# Patient Record
Sex: FEMALE | NOT HISPANIC OR LATINO | URBAN - METROPOLITAN AREA
[De-identification: names, ages, dates, MRNs, and addresses within clinical notes are randomized per-mention and may not be internally consistent; named-entity substitution may affect disease eponyms.]

---

## 2018-12-10 ENCOUNTER — OFFICE VISIT (OUTPATIENT)
Dept: URBAN - METROPOLITAN AREA CLINIC 76 | Facility: CLINIC | Age: 53
End: 2018-12-10
Payer: COMMERCIAL

## 2018-12-10 DIAGNOSIS — E11.3293 TYPE 2 DIAB W MILD NONPRLF DIABETIC RTNOP W/O MACULAR EDEMA, BILATERAL: ICD-10-CM

## 2018-12-10 DIAGNOSIS — H52.4 PRESBYOPIA: Primary | ICD-10-CM

## 2018-12-10 DIAGNOSIS — H04.123 DRY EYE SYNDROME OF BILATERAL LACRIMAL GLANDS: ICD-10-CM

## 2018-12-10 PROCEDURE — 92015 DETERMINE REFRACTIVE STATE: CPT | Performed by: OPTOMETRIST

## 2018-12-10 PROCEDURE — 92004 COMPRE OPH EXAM NEW PT 1/>: CPT | Performed by: OPTOMETRIST

## 2018-12-10 ASSESSMENT — INTRAOCULAR PRESSURE
OD: 15
OS: 15

## 2018-12-10 ASSESSMENT — VISUAL ACUITY
OD: 20/25
OS: 20/25

## 2018-12-10 NOTE — IMPRESSION/PLAN
Impression: Age-related nuclear cataract, bilateral: H25.13. OU. Plan: Cataracts account for the patient's complaints. Patient understands changing glasses will not improve vision. Recommend PRE OP w/ Dr. Dioni Murguia for cataract surgery.

## 2018-12-10 NOTE — IMPRESSION/PLAN
Impression: Dry eye syndrome of bilateral lacrimal glands: H04.123. Plan: Discussed diagnosis in detail with patient. Patient instructed to use emulsive base lubricant 3-4 x a day. Warm compresses with lid massage from top / down, bottom / up, and sweep from inside / out x 2. Increase omega foods.

## 2018-12-10 NOTE — IMPRESSION/PLAN
Impression: Type 2 diab w mild nonprlf diabetic rtnop w/o macular edema, bilateral: A15.9470. OU. Plan: Discussed diagnosis in detail with patient. No treatment is required at this time. Emphasized blood sugar control. Call if [place] worsens. Will continue to observe condition and or symptoms.

## 2018-12-10 NOTE — IMPRESSION/PLAN
Impression: Presbyopia: H52.4. OU. Plan: Hold off on mrx until after cataract surgery- unless pt it wanting to hold off on surgery.

## 2019-01-23 ENCOUNTER — OFFICE VISIT (OUTPATIENT)
Dept: URBAN - METROPOLITAN AREA CLINIC 76 | Facility: CLINIC | Age: 54
End: 2019-01-23
Payer: MEDICARE

## 2019-01-23 DIAGNOSIS — H18.832 RECURRENT EROSION OF CORNEA, LEFT EYE: ICD-10-CM

## 2019-01-23 DIAGNOSIS — H25.13 AGE-RELATED NUCLEAR CATARACT, BILATERAL: Primary | ICD-10-CM

## 2019-01-23 PROCEDURE — 92002 INTRM OPH EXAM NEW PATIENT: CPT | Performed by: OPHTHALMOLOGY

## 2019-01-23 PROCEDURE — 92012 INTRM OPH EXAM EST PATIENT: CPT | Performed by: OPHTHALMOLOGY

## 2019-01-23 RX ORDER — OFLOXACIN 3 MG/ML
0.3 % SOLUTION/ DROPS OPHTHALMIC
Qty: 1 | Refills: 0 | Status: INACTIVE
Start: 2019-01-23 | End: 2019-03-22

## 2019-01-23 ASSESSMENT — INTRAOCULAR PRESSURE
OD: 14
OS: 10

## 2019-01-23 NOTE — IMPRESSION/PLAN
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 OU. Plan: Discussed diagnosis in detail with patient. No treatment is required at this time. Emphasized blood sugar control. Call if [address] worsens. Will continue to observe condition and or symptoms.

## 2019-01-23 NOTE — IMPRESSION/PLAN
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 likely secondary to dry eye Plan: Discussed diagnosis in detail with patient. Recommend using OTC ointment at bed time. Continue AT's tears and gel drops 4-6 x per day OU.

## 2019-01-23 NOTE — IMPRESSION/PLAN
Impression: Age-related nuclear cataract, bilateral: H25.13. OU. Plan: Cataracts account for the patient's complaints. recommend holding off on cataract sx until dry eye stable.

## 2019-01-30 ENCOUNTER — OFFICE VISIT (OUTPATIENT)
Dept: URBAN - METROPOLITAN AREA CLINIC 76 | Facility: CLINIC | Age: 54
End: 2019-01-30
Payer: MEDICARE

## 2019-01-30 PROCEDURE — 99213 OFFICE O/P EST LOW 20 MIN: CPT | Performed by: OPTOMETRIST

## 2019-01-30 RX ORDER — KETOTIFEN FUMARATE 0.35 MG/ML
SOLUTION/ DROPS OPHTHALMIC
Qty: 15 | Refills: 3 | Status: INACTIVE
Start: 2019-01-30 | End: 2019-07-25

## 2019-01-30 ASSESSMENT — INTRAOCULAR PRESSURE
OD: 13
OS: 13

## 2019-01-30 NOTE — IMPRESSION/PLAN
Impression: Recurrent erosion of cornea, left eye: H18.832 OS. Plan: D/C blair. Cont Lubes qid ou K Erosion healed well os.

## 2019-03-22 ENCOUNTER — OFFICE VISIT (OUTPATIENT)
Dept: URBAN - METROPOLITAN AREA CLINIC 76 | Facility: CLINIC | Age: 54
End: 2019-03-22
Payer: MEDICARE

## 2019-03-22 PROCEDURE — 99215 OFFICE O/P EST HI 40 MIN: CPT | Performed by: OPHTHALMOLOGY

## 2019-03-22 RX ORDER — DUREZOL 0.5 MG/ML
0.05 % EMULSION OPHTHALMIC
Qty: 1 | Refills: 1 | Status: INACTIVE
Start: 2019-03-22 | End: 2019-05-03

## 2019-03-22 RX ORDER — OFLOXACIN 3 MG/ML
0.3 % SOLUTION/ DROPS OPHTHALMIC
Qty: 1 | Refills: 1 | Status: INACTIVE
Start: 2019-03-22 | End: 2019-04-11

## 2019-03-22 ASSESSMENT — INTRAOCULAR PRESSURE
OD: 12
OS: 13

## 2019-03-22 ASSESSMENT — VISUAL ACUITY
OS: 20/50
OD: 20/60

## 2019-03-22 ASSESSMENT — KERATOMETRY
OD: 46.13
OS: 46.38

## 2019-03-22 NOTE — IMPRESSION/PLAN
Impression: Age-related nuclear cataract, bilateral: H25.13. OU. Visually significant Plan: Cataracts account for the patient's complaints. Discussed all risks, benefits, procedures and recovery. Patient understands changing glasses will not improve vision. Discussed added risk due to DM w/ mild NPDR OU, ? amblyopia OD and astigmatism. Patient desires to have surgery, recommend CE IOL OU, OS first.  Discussed iol options, recommend STANDARD  IOL . TARGET: DISTANCE RL2 Discussed astigmatism diagnosis with patient. Patient understands that standard lens does not correct for astigmatism and patient will need gls for distance and near after Cataract Surgery. Patient understands.

## 2019-03-22 NOTE — IMPRESSION/PLAN
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 OU. Plan: Mild NPDR, no signs of neovascularization noted. No treatment necessary at this time. Patient was instructed to monitor vision for sudden changes and to call if visual changes noted. Discussed ocular and systemic benefits of blood sugar control. Discussed added risk.

## 2019-03-25 ENCOUNTER — PRE-OPERATIVE VISIT (OUTPATIENT)
Dept: URBAN - METROPOLITAN AREA CLINIC 76 | Facility: CLINIC | Age: 54
End: 2019-03-25
Payer: MEDICARE

## 2019-03-25 PROCEDURE — 92136 OPHTHALMIC BIOMETRY: CPT | Performed by: OPHTHALMOLOGY

## 2019-03-25 ASSESSMENT — PACHYMETRY
OD: 21.95
OS: 22.11
OD: 3.18
OS: 3.00

## 2019-04-01 ENCOUNTER — SURGERY (OUTPATIENT)
Dept: URBAN - METROPOLITAN AREA SURGERY 47 | Facility: SURGERY | Age: 54
End: 2019-04-01
Payer: MEDICARE

## 2019-04-01 PROCEDURE — 66984 XCAPSL CTRC RMVL W/O ECP: CPT | Performed by: OPHTHALMOLOGY

## 2019-04-02 ENCOUNTER — POST-OPERATIVE VISIT (OUTPATIENT)
Dept: URBAN - METROPOLITAN AREA CLINIC 76 | Facility: CLINIC | Age: 54
End: 2019-04-02
Payer: MEDICARE

## 2019-04-02 PROCEDURE — 99024 POSTOP FOLLOW-UP VISIT: CPT | Performed by: OPTOMETRIST

## 2019-04-02 ASSESSMENT — INTRAOCULAR PRESSURE
OS: 19
OD: 12

## 2019-04-11 ENCOUNTER — POST-OPERATIVE VISIT (OUTPATIENT)
Dept: URBAN - METROPOLITAN AREA CLINIC 76 | Facility: CLINIC | Age: 54
End: 2019-04-11
Payer: MEDICARE

## 2019-04-11 DIAGNOSIS — Z09 ENCNTR FOR F/U EXAM AFT TRTMT FOR COND OTH THAN MALIG NEOPLM: Primary | ICD-10-CM

## 2019-04-11 PROCEDURE — 99024 POSTOP FOLLOW-UP VISIT: CPT | Performed by: OPHTHALMOLOGY

## 2019-04-11 ASSESSMENT — INTRAOCULAR PRESSURE
OS: 15
OD: 18

## 2019-04-11 ASSESSMENT — VISUAL ACUITY
OD: 20/30
OS: 20/30

## 2019-05-03 ENCOUNTER — OFFICE VISIT (OUTPATIENT)
Dept: URBAN - METROPOLITAN AREA CLINIC 76 | Facility: CLINIC | Age: 54
End: 2019-05-03
Payer: MEDICARE

## 2019-05-03 DIAGNOSIS — H25.11 AGE-RELATED NUCLEAR CATARACT, RIGHT EYE: Primary | ICD-10-CM

## 2019-05-03 PROCEDURE — 92012 INTRM OPH EXAM EST PATIENT: CPT | Performed by: OPHTHALMOLOGY

## 2019-05-03 RX ORDER — OFLOXACIN 3 MG/ML
0.3 % SOLUTION/ DROPS OPHTHALMIC
Qty: 1 | Refills: 1 | Status: INACTIVE
Start: 2019-05-03 | End: 2019-06-13

## 2019-05-03 RX ORDER — DUREZOL 0.5 MG/ML
0.05 % EMULSION OPHTHALMIC
Qty: 1 | Refills: 1 | Status: INACTIVE
Start: 2019-05-03 | End: 2019-06-13

## 2019-05-03 ASSESSMENT — VISUAL ACUITY
OD: 20/30-
OS: 20/20

## 2019-05-03 ASSESSMENT — INTRAOCULAR PRESSURE
OD: 18
OS: 12

## 2019-05-03 NOTE — IMPRESSION/PLAN
Impression: Age-related nuclear cataract, right eye: H25.11. OD. Visually significant Plan: Cataracts account for the patient's complaints. Discussed all risks, benefits, procedures and recovery. Patient understands changing glasses will not improve vision. Discussed added risk due to DM w/ mild NPDR OU, ? amblyopia OD and astigmatism. Patient desires to have surgery, recommend CE IOL OD. Discussed iol options, recommend STANDARD  IOL . TARGET: DISTANCE RL2 Discussed astigmatism diagnosis with patient. Patient understands that standard lens does not correct for astigmatism and patient will need gls for distance and near after Cataract Surgery. Patient understands.

## 2019-05-03 NOTE — IMPRESSION/PLAN
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 OU. Plan: Mild NPDR, no signs of neovascularization noted. No treatment necessary at this time. Patient was instructed to monitor vision for sudden changes and to call if visual changes noted. Discussed ocular and systemic benefits of blood sugar control. Discussed added risk.

## 2019-05-15 ENCOUNTER — SURGERY (OUTPATIENT)
Dept: URBAN - METROPOLITAN AREA SURGERY 47 | Facility: SURGERY | Age: 54
End: 2019-05-15
Payer: MEDICARE

## 2019-05-15 PROCEDURE — 66984 XCAPSL CTRC RMVL W/O ECP: CPT | Performed by: OPHTHALMOLOGY

## 2019-05-16 ENCOUNTER — POST-OPERATIVE VISIT (OUTPATIENT)
Dept: URBAN - METROPOLITAN AREA CLINIC 76 | Facility: CLINIC | Age: 54
End: 2019-05-16
Payer: MEDICARE

## 2019-05-16 PROCEDURE — 99024 POSTOP FOLLOW-UP VISIT: CPT | Performed by: OPTOMETRIST

## 2019-05-16 ASSESSMENT — INTRAOCULAR PRESSURE
OS: 12
OD: 12

## 2019-05-22 ENCOUNTER — POST-OPERATIVE VISIT (OUTPATIENT)
Dept: URBAN - METROPOLITAN AREA CLINIC 76 | Facility: CLINIC | Age: 54
End: 2019-05-22
Payer: MEDICARE

## 2019-05-22 PROCEDURE — 99024 POSTOP FOLLOW-UP VISIT: CPT | Performed by: OPTOMETRIST

## 2019-05-22 ASSESSMENT — VISUAL ACUITY
OS: 20/20
OD: 20/40+

## 2019-05-22 ASSESSMENT — INTRAOCULAR PRESSURE
OD: 11
OS: 13

## 2019-06-13 ENCOUNTER — POST-OPERATIVE VISIT (OUTPATIENT)
Dept: URBAN - METROPOLITAN AREA CLINIC 76 | Facility: CLINIC | Age: 54
End: 2019-06-13
Payer: MEDICARE

## 2019-06-13 PROCEDURE — 99024 POSTOP FOLLOW-UP VISIT: CPT | Performed by: OPTOMETRIST

## 2019-06-13 ASSESSMENT — INTRAOCULAR PRESSURE
OD: 10
OS: 12

## 2019-06-13 ASSESSMENT — VISUAL ACUITY
OD: 20/30-
OS: 20/25-

## 2019-07-25 ENCOUNTER — OFFICE VISIT (OUTPATIENT)
Dept: URBAN - METROPOLITAN AREA CLINIC 76 | Facility: CLINIC | Age: 54
End: 2019-07-25
Payer: MEDICARE

## 2019-07-25 DIAGNOSIS — Z96.1 PRESENCE OF INTRAOCULAR LENS: ICD-10-CM

## 2019-07-25 PROCEDURE — 99214 OFFICE O/P EST MOD 30 MIN: CPT | Performed by: OPHTHALMOLOGY

## 2019-07-25 ASSESSMENT — KERATOMETRY
OS: 47.75
OD: 47.63

## 2019-07-25 ASSESSMENT — INTRAOCULAR PRESSURE
OD: 14
OS: 13

## 2019-07-25 NOTE — IMPRESSION/PLAN
Impression: Type 2 diabetes mellitus w/o complication: W86.5. OU. Plan: No diabetic retinopathy, no signs of neovascularization noted. Discussed ocular and systemic benefits of blood sugar control.

## 2019-07-25 NOTE — IMPRESSION/PLAN
Impression: Other subjective visual disturbances: H53.19. OU. Negative scotoma OU Plan: Discussed with patient the option of IOL exchange. R/B/A of IOL exchange discussed. Advised patient that there is chance that even after IOL exchange she could have Negative scotoma. Patient would like think about options.

## 2019-08-07 ENCOUNTER — OFFICE VISIT (OUTPATIENT)
Dept: URBAN - METROPOLITAN AREA CLINIC 76 | Facility: CLINIC | Age: 54
End: 2019-08-07
Payer: MEDICARE

## 2019-08-07 DIAGNOSIS — E11.9 TYPE 2 DIABETES MELLITUS W/O COMPLICATION: ICD-10-CM

## 2019-08-07 PROCEDURE — 99213 OFFICE O/P EST LOW 20 MIN: CPT | Performed by: OPHTHALMOLOGY

## 2019-08-07 ASSESSMENT — INTRAOCULAR PRESSURE
OS: 13
OD: 12

## 2019-08-07 NOTE — IMPRESSION/PLAN
Impression: Other subjective visual disturbances: H53.19. OU. Negative scotoma OU
baseline VF full OU. Plan: reviewed VF with patient. Rediscussed with patient the option of IOL exchange. R/B/A of IOL exchange discussed. Advised patient that there is chance that even after IOL exchange she could have Negative scotoma. Patient would like think about options.

## 2019-08-07 NOTE — IMPRESSION/PLAN
Impression: Type 2 diabetes mellitus w/o complication: C70.5. OU. Plan: No diabetic retinopathy, no signs of neovascularization noted. Discussed ocular and systemic benefits of blood sugar control.

## 2020-01-31 ENCOUNTER — OFFICE VISIT (OUTPATIENT)
Dept: URBAN - METROPOLITAN AREA CLINIC 76 | Facility: CLINIC | Age: 55
End: 2020-01-31
Payer: MEDICARE

## 2020-01-31 DIAGNOSIS — Z98.41 CATARACT EXTRACTION STATUS, RIGHT EYE: ICD-10-CM

## 2020-01-31 DIAGNOSIS — Z98.42 CATARACT EXTRACTION STATUS, LEFT EYE: ICD-10-CM

## 2020-01-31 PROCEDURE — 92014 COMPRE OPH EXAM EST PT 1/>: CPT | Performed by: OPTOMETRIST

## 2020-01-31 ASSESSMENT — KERATOMETRY
OS: 48.25
OD: 48.63

## 2020-01-31 ASSESSMENT — INTRAOCULAR PRESSURE
OS: 12
OD: 11

## 2020-01-31 NOTE — IMPRESSION/PLAN
Impression: Dry eye syndrome of bilateral lacrimal glands: H04.123. OU. w/ allergies Plan: Discussed chronic nature of condition in detail with patient. Discussed dry eye as cause of fluctuating vision. Recommend Artificial tears four times a day, for continuous maintenance of tear film. Recommend Ketotifen BID OU for allergies.

## 2020-01-31 NOTE — IMPRESSION/PLAN
Impression: Other subjective visual disturbances: H53.19. OU. Negative scotoma OU Patient still having frustrations Plan: discussed with patient.  recommend scheduling consult with Dr Rony Machado to discuss options

## 2020-01-31 NOTE — IMPRESSION/PLAN
Impression: Type 2 diabetes mellitus w/o complication: E33.1. OU. Plan: Discussed diagnosis in detail with patient. No treatment is required at this time. Emphasized blood sugar control. Call if [address] worsens. Will continue to observe condition and or symptoms. Recommend yearly exams.

## 2020-02-06 ENCOUNTER — OFFICE VISIT (OUTPATIENT)
Dept: URBAN - METROPOLITAN AREA CLINIC 76 | Facility: CLINIC | Age: 55
End: 2020-02-06
Payer: MEDICARE

## 2020-02-06 DIAGNOSIS — H53.19 OTHER SUBJECTIVE VISUAL DISTURBANCES: Primary | ICD-10-CM

## 2020-02-06 PROCEDURE — 92014 COMPRE OPH EXAM EST PT 1/>: CPT | Performed by: OPHTHALMOLOGY

## 2020-02-06 ASSESSMENT — INTRAOCULAR PRESSURE
OS: 13
OD: 13

## 2020-02-06 NOTE — IMPRESSION/PLAN
Impression: Type 2 diabetes mellitus w/o complication: Y55.8. OU. Plan: Discussed diagnosis in detail with patient. No treatment is required at this time. Emphasized blood sugar control. Call if [address] worsens. Will continue to observe condition and or symptoms.

## 2020-02-06 NOTE — IMPRESSION/PLAN
Impression: Other subjective visual disturbances: H53.19. OU. Negative scotoma OU after cataract sx.
mydriasis not effective Plan: Discussed with patient. Rediscussed with patient treatment option Anterior capsulotomy laser vs IOL exchange. R/B/A of IOL exchange discussed. Discussed added risk due significant scar tissue OD. Advised patient that there is chance that even after IOL exchange/anterior capsulotomy she could have Negative scotoma.
 r/b/a of Anterior cap discussed, pt would like to proceed.

## 2020-02-17 ENCOUNTER — SURGERY (OUTPATIENT)
Dept: URBAN - METROPOLITAN AREA SURGERY 47 | Facility: SURGERY | Age: 55
End: 2020-02-17
Payer: MEDICARE

## 2020-02-17 PROCEDURE — 66821 AFTER CATARACT LASER SURGERY: CPT | Performed by: OPHTHALMOLOGY

## 2020-02-25 ENCOUNTER — POST-OPERATIVE VISIT (OUTPATIENT)
Dept: URBAN - METROPOLITAN AREA CLINIC 76 | Facility: CLINIC | Age: 55
End: 2020-02-25
Payer: MEDICARE

## 2020-02-25 DIAGNOSIS — H26.491 OTHER SECONDARY CATARACT, RIGHT EYE: Primary | ICD-10-CM

## 2020-02-25 RX ORDER — FLUOROMETHOLONE 1 MG/ML
0.1 % SOLUTION/ DROPS OPHTHALMIC
Qty: 5 | Refills: 0 | Status: INACTIVE
Start: 2020-02-25 | End: 2020-02-25

## 2020-02-25 RX ORDER — FLUOROMETHOLONE 1 MG/ML
0.1 % SOLUTION/ DROPS OPHTHALMIC
Qty: 5 | Refills: 0 | Status: INACTIVE
Start: 2020-02-25 | End: 2020-05-12

## 2020-02-25 ASSESSMENT — VISUAL ACUITY
OD: 20/40
OS: 20/25

## 2020-02-25 ASSESSMENT — INTRAOCULAR PRESSURE
OS: 10
OD: 9

## 2020-02-25 NOTE — IMPRESSION/PLAN
Impression: S/P Anterior Capsulotomy OD - 8 Days. Anterior opacification/visual disturbance, right eye  H26.491. Pt still experiencing like vaseline on the eye. No improvement w/ ant cap. MAC OCT today WNL OU. DANIELLE likely accounts for blurry vision. Plan: Discussed condition. Final PO mrx given. Use Refresh artificial tears QID OU. Start FML QID OU. Pt to call with any concerns.

## 2020-05-12 ENCOUNTER — OFFICE VISIT (OUTPATIENT)
Dept: URBAN - METROPOLITAN AREA CLINIC 76 | Facility: CLINIC | Age: 55
End: 2020-05-12
Payer: MEDICARE

## 2020-05-12 DIAGNOSIS — H53.8 OTHER VISUAL DISTURBANCES: ICD-10-CM

## 2020-05-12 PROCEDURE — 92012 INTRM OPH EXAM EST PATIENT: CPT | Performed by: OPTOMETRIST

## 2020-05-12 RX ORDER — FLUOROMETHOLONE 1 MG/ML
0.1 % SOLUTION/ DROPS OPHTHALMIC
Qty: 5 | Refills: 2 | Status: ACTIVE
Start: 2020-05-12

## 2020-05-12 ASSESSMENT — INTRAOCULAR PRESSURE
OD: 10
OS: 10

## 2020-05-12 NOTE — IMPRESSION/PLAN
Impression: Dry eye syndrome of bilateral lacrimal glands: H04.123. OU. w/ allergic overlay. Plan: Discussed chronic nature of condition in detail with patient. Discussed dry eye as cause of fluctuating vision and sharp pain. Recommend switch to pres free artificial tears 6-8 times per day, try Refresh Relieva Pres Free- sample given. Restart FML QID OU, rub excess into lids and lashes. Consider punctal plugs at next visit. Use Ketotifen BID OU for allergies.

## 2020-05-12 NOTE — IMPRESSION/PLAN
Impression: Other visual disturbances: H53.8. ever since having cataract surgery. I think it's the implants. Plan: Discussed symptoms not related to IOL's. Reassured pt of results of cat sx.

## 2020-06-02 ENCOUNTER — OFFICE VISIT (OUTPATIENT)
Dept: URBAN - METROPOLITAN AREA CLINIC 76 | Facility: CLINIC | Age: 55
End: 2020-06-02
Payer: COMMERCIAL

## 2020-06-02 DIAGNOSIS — H10.45 OTHER CHRONIC ALLERGIC CONJUNCTIVITIS: Primary | ICD-10-CM

## 2020-06-02 PROCEDURE — 99213 OFFICE O/P EST LOW 20 MIN: CPT | Performed by: OPTOMETRIST

## 2020-06-02 ASSESSMENT — INTRAOCULAR PRESSURE
OS: 13
OD: 10

## 2020-06-02 NOTE — IMPRESSION/PLAN
Impression: Dry eye syndrome of bilateral lacrimal glands: H04.123. OU. w/ allergic overlay. Need to attack allergy component before considering plugs. Plan: Discussed chronic nature of condition in detail with patient. Discussed dry eye as cause of fluctuating vision and sharp pain. Recommend switch to pres free artificial tears 6-8 times per day, try Refresh Relieva Pres Free. Continue FML as needed for rescue drop.

## 2022-10-12 ENCOUNTER — OFFICE VISIT (OUTPATIENT)
Dept: URBAN - METROPOLITAN AREA CLINIC 76 | Facility: CLINIC | Age: 57
End: 2022-10-12
Payer: COMMERCIAL

## 2022-10-12 DIAGNOSIS — Z96.1 PRESENCE OF INTRAOCULAR LENS: ICD-10-CM

## 2022-10-12 DIAGNOSIS — E11.3293 TYPE 2 DIAB W MILD NONPRLF DIABETIC RTNOP W/O MACULAR EDEMA, BILATERAL: Primary | ICD-10-CM

## 2022-10-12 DIAGNOSIS — H52.4 PRESBYOPIA: ICD-10-CM

## 2022-10-12 PROCEDURE — 99213 OFFICE O/P EST LOW 20 MIN: CPT | Performed by: OPTOMETRIST

## 2022-10-12 ASSESSMENT — INTRAOCULAR PRESSURE
OD: 13
OS: 15

## 2022-10-12 ASSESSMENT — KERATOMETRY
OS: 47.50
OD: 39.38

## 2022-10-12 NOTE — IMPRESSION/PLAN
Impression: Type 2 diab w mild nonprlf diabetic rtnop w/o macular edema, bilateral: B11.0843 OU. Plan: Emphasized blood sugar control. Patient to continue care with PCP for proper glycemic management. Discussed diagnosis in detail with patient. No treatment is required at this time. Emphasized blood sugar control. Call if [address] worsens. Will continue to observe condition and or symptoms, keep follow ups with primary care for glycemic management. Recommend next available Retina Consult.